# Patient Record
Sex: FEMALE | Race: WHITE
[De-identification: names, ages, dates, MRNs, and addresses within clinical notes are randomized per-mention and may not be internally consistent; named-entity substitution may affect disease eponyms.]

---

## 2021-08-26 ENCOUNTER — HOSPITAL ENCOUNTER (EMERGENCY)
Dept: HOSPITAL 4 - SED | Age: 46
Discharge: HOME | End: 2021-08-26
Payer: COMMERCIAL

## 2021-08-26 VITALS — BODY MASS INDEX: 28.52 KG/M2 | HEIGHT: 62 IN | WEIGHT: 155 LBS

## 2021-08-26 VITALS — SYSTOLIC BLOOD PRESSURE: 131 MMHG

## 2021-08-26 DIAGNOSIS — I10: ICD-10-CM

## 2021-08-26 DIAGNOSIS — H53.8: Primary | ICD-10-CM

## 2021-08-26 NOTE — NUR
Pt walked in to ER with c/o eye pain and twitching last night, resolved this 
morning but she still "feels funny" V/S stable, no acute distress noted.